# Patient Record
Sex: MALE | Race: WHITE | NOT HISPANIC OR LATINO | Employment: FULL TIME | URBAN - METROPOLITAN AREA
[De-identification: names, ages, dates, MRNs, and addresses within clinical notes are randomized per-mention and may not be internally consistent; named-entity substitution may affect disease eponyms.]

---

## 2017-01-16 ENCOUNTER — OFFICE VISIT (OUTPATIENT)
Dept: SLEEP MEDICINE | Facility: HOSPITAL | Age: 62
End: 2017-01-16

## 2017-01-16 VITALS
SYSTOLIC BLOOD PRESSURE: 120 MMHG | WEIGHT: 239 LBS | HEART RATE: 66 BPM | OXYGEN SATURATION: 98 % | DIASTOLIC BLOOD PRESSURE: 78 MMHG | BODY MASS INDEX: 33.46 KG/M2 | HEIGHT: 71 IN

## 2017-01-16 DIAGNOSIS — E66.9 OBESITY (BMI 30-39.9): ICD-10-CM

## 2017-01-16 DIAGNOSIS — G47.33 MODERATE OBSTRUCTIVE SLEEP APNEA: Primary | ICD-10-CM

## 2017-01-16 PROBLEM — E11.9 DIABETES MELLITUS (HCC): Status: ACTIVE | Noted: 2017-01-16

## 2017-01-16 PROBLEM — E78.5 DYSLIPIDEMIA: Status: ACTIVE | Noted: 2017-01-16

## 2017-01-16 PROBLEM — M19.90 ARTHRITIS: Status: ACTIVE | Noted: 2017-01-16

## 2017-01-16 PROBLEM — E03.9 HYPOTHYROIDISM: Status: ACTIVE | Noted: 2017-01-16

## 2017-01-16 PROCEDURE — 99213 OFFICE O/P EST LOW 20 MIN: CPT | Performed by: INTERNAL MEDICINE

## 2017-01-16 RX ORDER — CIPROFLOXACIN 250 MG/1
TABLET, FILM COATED ORAL
Refills: 0 | COMMUNITY
Start: 2016-11-16 | End: 2018-04-30

## 2017-01-16 RX ORDER — METFORMIN HYDROCHLORIDE 500 MG/1
TABLET, EXTENDED RELEASE ORAL
COMMUNITY
Start: 2017-01-12

## 2017-01-16 RX ORDER — ATORVASTATIN CALCIUM 40 MG/1
TABLET, FILM COATED ORAL
Refills: 12 | COMMUNITY
Start: 2017-01-05

## 2017-01-16 NOTE — LETTER
January 16, 2017     Michael Stephens MD  [address] [facility] [facility] [address]  [facility] [address]    Patient: Josse Israel   YOB: 1955   Date of Visit: 1/16/2017       Dear Dr. Kat MD:    Thank you for referring Josse Israel to me for evaluation. Below are the relevant portions of my assessment and plan of care.    If you have questions, please do not hesitate to call me. I look forward to following Josse along with you.         Sincerely,        Azam Simon MD        CC: No Recipients  Azam Simon MD  1/16/2017  9:42 AM  Sign at close encounter      Subjective:     Chief Complaint:   Chief Complaint   Patient presents with   • Follow-up       HPI:    Josse Israel is a 61 y.o. male here for follow-up of obstructive sleep apnea.    Since starting PAP sleep problem has: remained the same  Currently using PAP: yes Hours of usage during the night: 5    Amount of sleep per night : 6 hours  Average length of time it takes to fall asleep : 15 minutes  Number of awakenings per night : 2     He feels fatigue (tiredness, exhaustion, lethargy) in the daytime even when not sleepy? Occasionally   He feels sleepy (or struggles to stay awake) in the daytime? Occasionally     [place] Scale scored as 7/24.    Type of mask: full face mask    He (since starting PAP or since last visit) has problems with the following:   Pressure from the mask 5 - Moderate Problems  Skin irritation from the mask 0 - No Problem  Mask coming off face 0 - No Problem  Air leaks from the mask 9 - Severe Problems  Dry mouth or throat 0 - No Problem  Nasal congestion 0 - No Problem    I reviewed his PAP download:  Average pressure: 12  Average AHI:  1  Average minutes in large leak per night: 60      Current medications are:   Current Outpatient Prescriptions:   •  aspirin 81 MG EC tablet, Take 81 mg by mouth Daily., Disp: , Rfl:   •  atorvastatin (LIPITOR) 20 MG tablet, Take 20 mg by mouth Daily., Disp: ,  Rfl:   •  atorvastatin (LIPITOR) 40 MG tablet, , Disp: , Rfl: 12  •  cilostazol (PLETAL) 100 MG tablet, Take 100 mg by mouth 2 (Two) Times a Day., Disp: , Rfl:   •  diclofenac-misoprostol (ARTHROTEC) 75-0.2 MG EC tablet, Take 1 tablet by mouth 2 (Two) Times a Day., Disp: , Rfl:   •  fenofibrate 160 MG tablet, Take 160 mg by mouth Daily., Disp: , Rfl:   •  glucosamine-chondroitin 500-400 MG capsule capsule, Take  by mouth 3 (Three) Times a Day With Meals., Disp: , Rfl:   •  levothyroxine (LEVOXYL) 75 MCG tablet, Take 75 mcg by mouth Daily., Disp: , Rfl:   •  levothyroxine (SYNTHROID, LEVOTHROID) 88 MCG tablet, Take 88 mcg by mouth Daily., Disp: , Rfl:   •  metFORMIN (GLUCOPHAGE) 500 MG tablet, Take 500 mg by mouth 2 (Two) Times a Day With Meals., Disp: , Rfl:   •  metFORMIN XR (GLUCOPHAGE-XR) 500 MG 24 hr tablet, , Disp: , Rfl:   •  Omega-3 Fatty Acids (FISH OIL) 1000 MG capsule capsule, Take  by mouth Daily With Breakfast., Disp: , Rfl:   •  testosterone (ANDROGEL) 25 MG/2.5GM (1%) gel gel, Place  on the skin Daily., Disp: , Rfl:   •  ciprofloxacin (CIPRO) 250 MG tablet, , Disp: , Rfl: 0.      The patient's relevant past medical, surgical, family and social history were reviewed and updated in Epic as appropriate.     ROS:    Review of Systems   Constitutional: Positive for fatigue. Negative for unexpected weight change.   Psychiatric/Behavioral: Positive for sleep disturbance.         Objective:    Physical Exam   Constitutional: He is oriented to person, place, and time. He appears well-developed and well-nourished.   HENT:   Head: Normocephalic and atraumatic.   Mouth/Throat: Oropharynx is clear and moist.   Class II airway   Neck: Neck supple. No thyromegaly present.   Cardiovascular: Normal rate and regular rhythm.  Exam reveals no gallop and no friction rub.    No murmur heard.  Pulmonary/Chest: Effort normal. No respiratory distress. He has no wheezes. He has no rales.   Abdominal: Soft. Bowel sounds are  normal.   Musculoskeletal: He exhibits no edema.   Neurological: He is alert and oriented to person, place, and time.   Skin: Skin is warm and dry.   Psychiatric: He has a normal mood and affect. His behavior is normal.   Vitals reviewed.        Assessment:    Problem List Items Addressed This Visit        Pulmonary Problems    Moderate obstructive sleep apnea - Primary    Overview     CPAP 11            Other    Obesity (BMI 30-39.9)              Plan:     No change in PAP settings.  No change in his mask size or type.  Continued efforts at further weight loss.  I urged 7-8 hours of sleep per night on average.  I renewed supplies for the next year.  Follow-up scheduled.  If problems in the interim he knows how to contact us or his [employer] company.        Signed by  MD BREE Fried Dragon/Transcription disclaimer:  Much of this encounter note is an electronic transcription/translation of spoken language to printed text.  Electronic translation of spoken language may permit erroneous, or at times, nonsensical words or phrases to be inadvertently transcribed.  Although I have reviewed the note for such errors, some may still exist.

## 2017-01-16 NOTE — PROGRESS NOTES
Subjective:     Chief Complaint:   Chief Complaint   Patient presents with   • Follow-up       HPI:    Josse Israel is a 61 y.o. male here for follow-up of obstructive sleep apnea.    Since starting PAP sleep problem has: remained the same  Currently using PAP: yes Hours of usage during the night: 5    Amount of sleep per night : 6 hours  Average length of time it takes to fall asleep : 15 minutes  Number of awakenings per night : 2     He feels fatigue (tiredness, exhaustion, lethargy) in the daytime even when not sleepy? Occasionally   He feels sleepy (or struggles to stay awake) in the daytime? Occasionally     [place] Scale scored as 7/24.    Type of mask: full face mask    He (since starting PAP or since last visit) has problems with the following:   Pressure from the mask 5 - Moderate Problems  Skin irritation from the mask 0 - No Problem  Mask coming off face 0 - No Problem  Air leaks from the mask 9 - Severe Problems  Dry mouth or throat 0 - No Problem  Nasal congestion 0 - No Problem    I reviewed his PAP download:  Average pressure: 12  Average AHI:  1  Average minutes in large leak per night: 60      Current medications are:   Current Outpatient Prescriptions:   •  aspirin 81 MG EC tablet, Take 81 mg by mouth Daily., Disp: , Rfl:   •  atorvastatin (LIPITOR) 20 MG tablet, Take 20 mg by mouth Daily., Disp: , Rfl:   •  atorvastatin (LIPITOR) 40 MG tablet, , Disp: , Rfl: 12  •  cilostazol (PLETAL) 100 MG tablet, Take 100 mg by mouth 2 (Two) Times a Day., Disp: , Rfl:   •  diclofenac-misoprostol (ARTHROTEC) 75-0.2 MG EC tablet, Take 1 tablet by mouth 2 (Two) Times a Day., Disp: , Rfl:   •  fenofibrate 160 MG tablet, Take 160 mg by mouth Daily., Disp: , Rfl:   •  glucosamine-chondroitin 500-400 MG capsule capsule, Take  by mouth 3 (Three) Times a Day With Meals., Disp: , Rfl:   •  levothyroxine (LEVOXYL) 75 MCG tablet, Take 75 mcg by mouth Daily., Disp: , Rfl:   •  levothyroxine (SYNTHROID, LEVOTHROID) 88  MCG tablet, Take 88 mcg by mouth Daily., Disp: , Rfl:   •  metFORMIN (GLUCOPHAGE) 500 MG tablet, Take 500 mg by mouth 2 (Two) Times a Day With Meals., Disp: , Rfl:   •  metFORMIN XR (GLUCOPHAGE-XR) 500 MG 24 hr tablet, , Disp: , Rfl:   •  Omega-3 Fatty Acids (FISH OIL) 1000 MG capsule capsule, Take  by mouth Daily With Breakfast., Disp: , Rfl:   •  testosterone (ANDROGEL) 25 MG/2.5GM (1%) gel gel, Place  on the skin Daily., Disp: , Rfl:   •  ciprofloxacin (CIPRO) 250 MG tablet, , Disp: , Rfl: 0.      The patient's relevant past medical, surgical, family and social history were reviewed and updated in Epic as appropriate.     ROS:    Review of Systems   Constitutional: Positive for fatigue. Negative for unexpected weight change.   Psychiatric/Behavioral: Positive for sleep disturbance.         Objective:    Physical Exam   Constitutional: He is oriented to person, place, and time. He appears well-developed and well-nourished.   HENT:   Head: Normocephalic and atraumatic.   Mouth/Throat: Oropharynx is clear and moist.   Class II airway   Neck: Neck supple. No thyromegaly present.   Cardiovascular: Normal rate and regular rhythm.  Exam reveals no gallop and no friction rub.    No murmur heard.  Pulmonary/Chest: Effort normal. No respiratory distress. He has no wheezes. He has no rales.   Abdominal: Soft. Bowel sounds are normal.   Musculoskeletal: He exhibits no edema.   Neurological: He is alert and oriented to person, place, and time.   Skin: Skin is warm and dry.   Psychiatric: He has a normal mood and affect. His behavior is normal.   Vitals reviewed.        Assessment:    Problem List Items Addressed This Visit        Pulmonary Problems    Moderate obstructive sleep apnea - Primary    Overview     CPAP 11            Other    Obesity (BMI 30-39.9)              Plan:     No change in PAP settings.  No change in his mask size or type.  Continued efforts at further weight loss.  I urged 7-8 hours of sleep per night on  average.  I renewed supplies for the next year.  Follow-up scheduled.  If problems in the interim he knows how to contact us or his DME company.        Signed by  MD BREE Fried/Transcription disclaimer:  Much of this encounter note is an electronic transcription/translation of spoken language to printed text.  Electronic translation of spoken language may permit erroneous, or at times, nonsensical words or phrases to be inadvertently transcribed.  Although I have reviewed the note for such errors, some may still exist.

## 2017-01-16 NOTE — Clinical Note
January 16, 2017     Michael Stephens MD  [address] [facility] [address]  [facility] [address]    Patient: Josse Israel   YOB: 1955   Date of Visit: 1/16/2017       Dear Dr. Kat MD:    Thank you for referring Josse Israel to me for evaluation. Below are the relevant portions of my assessment and plan of care.                   If you have questions, please do not hesitate to call me. I look forward to following Josse along with you.         Sincerely,        Azam Simon MD        CC: No Recipients

## 2017-01-16 NOTE — MR AVS SNAPSHOT
Josse Israel   1/16/2017 8:45 AM   Office Visit    Dept Phone:  [phone]   Encounter #:  [id number]    Provider:  Azam Simon MD   Department:  [facility]                Your Full Care Plan              Your Updated Medication List          This list is accurate as of: 1/16/17  9:58 AM.  Always use your most recent med list.                ARTHROTEC 75-0.2 MG EC tablet   Generic drug:  diclofenac-misoprostol       aspirin 81 MG EC tablet       * atorvastatin 20 MG tablet   Commonly known as:  LIPITOR       * atorvastatin 40 MG tablet   Commonly known as:  LIPITOR       cilostazol 100 MG tablet   Commonly known as:  PLETAL       ciprofloxacin 250 MG tablet   Commonly known as:  CIPRO       fenofibrate 160 MG tablet       fish oil 1000 MG capsule capsule       glucosamine-chondroitin 500-400 MG capsule capsule       * levothyroxine 88 MCG tablet   Commonly known as:  SYNTHROID, LEVOTHROID       * LEVOXYL 75 MCG tablet   Generic drug:  levothyroxine       * metFORMIN 500 MG tablet   Commonly known as:  GLUCOPHAGE       * metFORMIN  MG 24 hr tablet   Commonly known as:  GLUCOPHAGE-XR       testosterone 25 MG/2.5GM (1%) gel gel   Commonly known as:  ANDROGEL       * Notice:  This list has 6 medication(s) that are the same as other medications prescribed for you. Read the directions carefully, and ask your doctor or other care provider to review them with you.            We Performed the Following     PAP Therapy       You Were Diagnosed With        Codes Comments    Moderate obstructive sleep apnea    -  Primary ICD-10-CM: G47.33  ICD-9-CM: 327.23     Obesity (BMI 30-39.9)     ICD-10-CM: E66.9  ICD-9-CM: 278.00       Instructions     None    Patient Instructions History      Upcoming Appointments     Visit Type Date Time Department    FOLLOW UP 1/16/2017  8:45 AM [facility]      MyChart Signup     [facility] MyChart allows  "you to send messages to your doctor, view your test results, renew your prescriptions, schedule appointments, and more. To sign up, go to [email or web address] and click on the Sign Up Now link in the New User? box. Enter your [employer] Activation Code exactly as it appears below along with the last four digits of your Social Security Number and your Date of Birth () to complete the sign-up process. If you do not sign up before the expiration date, you must request a new code.    [employer] Activation Code: [email or web address]  Expires: 2017  9:58 AM    If you have questions, you can email [email or web address] or call [phone] to talk to our [employer] staff. Remember, [employer] is NOT to be used for urgent needs. For medical emergencies, dial 911.               Other Info from Your Visit           Allergies     Cephalosporins      Niacin And Related      Penicillins      Sulfa Antibiotics      Tetracyclines & Related        Reason for Visit     Follow-up           Vital Signs     Blood Pressure Pulse Height Weight Oxygen Saturation Body Mass Index    120/78 66 71\" (180.3 cm) 239 lb (108 kg) 98% 33.33 kg/m2    Smoking Status                   Light Tobacco Smoker           Problems and Diagnoses Noted     Arthritis    Diabetes    Dyslipidemia    Underactive thyroid    Moderate obstructive sleep apnea    Obesity (BMI 30-39.9)        "

## 2017-11-13 ENCOUNTER — TRANSCRIBE ORDERS (OUTPATIENT)
Dept: ADMINISTRATIVE | Facility: HOSPITAL | Age: 62
End: 2017-11-13

## 2017-11-13 DIAGNOSIS — Z87.891 PERSONAL HISTORY OF TOBACCO USE, PRESENTING HAZARDS TO HEALTH: Primary | ICD-10-CM

## 2017-11-13 DIAGNOSIS — I25.10 CARDIOVASCULAR DISEASE: ICD-10-CM

## 2017-11-27 ENCOUNTER — HOSPITAL ENCOUNTER (OUTPATIENT)
Dept: CT IMAGING | Facility: HOSPITAL | Age: 62
Discharge: HOME | End: 2017-11-27
Attending: FAMILY MEDICINE | Admitting: FAMILY MEDICINE

## 2017-11-27 ENCOUNTER — HOSPITAL ENCOUNTER (OUTPATIENT)
Dept: CARDIOLOGY | Facility: HOSPITAL | Age: 62
Discharge: HOME | End: 2017-11-27
Attending: FAMILY MEDICINE

## 2017-11-27 VITALS — WEIGHT: 230 LBS | BODY MASS INDEX: 32.2 KG/M2 | HEIGHT: 71 IN

## 2017-11-27 DIAGNOSIS — I25.10 CARDIOVASCULAR DISEASE: ICD-10-CM

## 2017-11-27 DIAGNOSIS — Z87.891 PERSONAL HISTORY OF TOBACCO USE, PRESENTING HAZARDS TO HEALTH: ICD-10-CM

## 2017-11-27 LAB
BH CV STRESS BP STAGE 1: NORMAL
BH CV STRESS BP STAGE 2: NORMAL
BH CV STRESS BP STAGE 3: NORMAL
BH CV STRESS DURATION MIN STAGE 1: 3
BH CV STRESS DURATION MIN STAGE 2: 3
BH CV STRESS DURATION MIN STAGE 3: 2
BH CV STRESS DURATION SEC STAGE 1: 0
BH CV STRESS DURATION SEC STAGE 2: 0
BH CV STRESS DURATION SEC STAGE 3: 46
BH CV STRESS GRADE STAGE 1: 10
BH CV STRESS GRADE STAGE 2: 12
BH CV STRESS GRADE STAGE 3: 14
BH CV STRESS HR STAGE 1: 97
BH CV STRESS HR STAGE 2: 115
BH CV STRESS HR STAGE 3: 126
BH CV STRESS METS STAGE 1: 5
BH CV STRESS METS STAGE 2: 7.5
BH CV STRESS METS STAGE 3: 10
BH CV STRESS PROTOCOL 1: NORMAL
BH CV STRESS RECOVERY BP: NORMAL MMHG
BH CV STRESS RECOVERY HR: 79 BPM
BH CV STRESS SPEED STAGE 1: 1.7
BH CV STRESS SPEED STAGE 2: 2.5
BH CV STRESS SPEED STAGE 3: 3.4
BH CV STRESS STAGE 1: 1
BH CV STRESS STAGE 2: 2
BH CV STRESS STAGE 3: 3
LV EF NUC BP: 80 %
MAXIMAL PREDICTED HEART RATE: 158 BPM
PERCENT MAX PREDICTED HR: 91.14 %
STRESS BASELINE BP: NORMAL MMHG
STRESS BASELINE HR: 55 BPM
STRESS PERCENT HR: 107 %
STRESS POST ESTIMATED WORKLOAD: 10.1 METS
STRESS POST EXERCISE DUR MIN: 8 MIN
STRESS POST EXERCISE DUR SEC: 46 SEC
STRESS POST PEAK BP: NORMAL MMHG
STRESS POST PEAK HR: 144 BPM
STRESS TARGET HR: 134 BPM

## 2017-11-27 PROCEDURE — 0 TECHNETIUM SESTAMIBI: Performed by: FAMILY MEDICINE

## 2017-11-27 PROCEDURE — G0297 LDCT FOR LUNG CA SCREEN: HCPCS

## 2017-11-27 PROCEDURE — 93017 CV STRESS TEST TRACING ONLY: CPT

## 2017-11-27 PROCEDURE — A9500 TC99M SESTAMIBI: HCPCS | Performed by: FAMILY MEDICINE

## 2017-11-27 PROCEDURE — 78452 HT MUSCLE IMAGE SPECT MULT: CPT

## 2017-11-27 RX ADMIN — TECHNETIUM TC-99M SESTAMIBI 1 DOSE: 1 INJECTION INTRAVENOUS at 10:15

## 2017-11-27 RX ADMIN — TECHNETIUM TC-99M SESTAMIBI 1 DOSE: 1 INJECTION INTRAVENOUS at 08:00

## 2018-04-30 ENCOUNTER — OFFICE VISIT (OUTPATIENT)
Dept: SLEEP MEDICINE | Facility: HOSPITAL | Age: 63
End: 2018-04-30

## 2018-04-30 VITALS
SYSTOLIC BLOOD PRESSURE: 124 MMHG | BODY MASS INDEX: 33.04 KG/M2 | OXYGEN SATURATION: 94 % | DIASTOLIC BLOOD PRESSURE: 66 MMHG | HEART RATE: 89 BPM | WEIGHT: 236 LBS | HEIGHT: 71 IN

## 2018-04-30 DIAGNOSIS — G47.33 MODERATE OBSTRUCTIVE SLEEP APNEA: Primary | ICD-10-CM

## 2018-04-30 DIAGNOSIS — E66.9 OBESITY (BMI 30-39.9): ICD-10-CM

## 2018-04-30 PROCEDURE — 99214 OFFICE O/P EST MOD 30 MIN: CPT | Performed by: INTERNAL MEDICINE

## 2018-04-30 NOTE — PROGRESS NOTES
Subjective:     Chief Complaint:   Chief Complaint   Patient presents with   • Follow-up       HPI:    Josse Israel is a 62 y.o. male here for follow-up of obstructive sleep apnea.    He remains on CPAP therapy at a pressure of 12.  He uses a fullface mask.  He is averaging about 5 hours of CPAP usage per night.    Further details are as follows:    Since last visit sleep problem has: remained the same  Currently using PAP: yes Hours of usage during the night: 5    Amount of sleep per night : 6 hours  Average length of time it takes to fall asleep : 15 minutes  Number of awakenings per night : 1     He feels fatigue (tiredness, exhaustion, lethargy) in the daytime even when not sleepy? Occasionally   He feels sleepy (or struggles to stay awake) in the daytime? Infrequently    [place] Scale scored as 7/24.    Type of mask: full face mask    He (since starting PAP or since last visit) has problems with the following:   Pressure from the mask 4 - Moderate Problems  Skin irritation from the mask 0 - No Problem  Mask coming off face 0 - No Problem  Air leaks from the mask 5 - Moderate Problems  Dry mouth or throat 8 - Severe Problems  Nasal congestion 0 - No Problem    I reviewed his PAP download:  Average pressure: 12  Average AHI:  2  Average minutes in large leak per night: 40      Current medications are:   Current Outpatient Prescriptions:   •  aspirin 81 MG EC tablet, Take 81 mg by mouth Daily., Disp: , Rfl:   •  atorvastatin (LIPITOR) 20 MG tablet, Take 20 mg by mouth Daily., Disp: , Rfl:   •  atorvastatin (LIPITOR) 40 MG tablet, , Disp: , Rfl: 12  •  cilostazol (PLETAL) 100 MG tablet, Take 100 mg by mouth 2 (Two) Times a Day., Disp: , Rfl:   •  diclofenac-misoprostol (ARTHROTEC) 75-0.2 MG EC tablet, Take 1 tablet by mouth 2 (Two) Times a Day., Disp: , Rfl:   •  fenofibrate 160 MG tablet, Take 160 mg by mouth Daily., Disp: , Rfl:   •  glucosamine-chondroitin 500-400 MG capsule capsule, Take  by mouth 3  (Three) Times a Day With Meals., Disp: , Rfl:   •  levothyroxine (LEVOXYL) 75 MCG tablet, Take 75 mcg by mouth Daily., Disp: , Rfl:   •  levothyroxine (SYNTHROID, LEVOTHROID) 88 MCG tablet, Take 88 mcg by mouth Daily., Disp: , Rfl:   •  metFORMIN (GLUCOPHAGE) 500 MG tablet, Take 500 mg by mouth 2 (Two) Times a Day With Meals., Disp: , Rfl:   •  metFORMIN XR (GLUCOPHAGE-XR) 500 MG 24 hr tablet, , Disp: , Rfl: .      The patient's relevant past medical, surgical, family and social history were reviewed and updated in Epic as appropriate.     ROS:    Review of Systems   Constitutional: Positive for fatigue.   Respiratory: Positive for apnea.    Psychiatric/Behavioral: Negative for sleep disturbance.         Objective:    Physical Exam   Constitutional: He is oriented to person, place, and time. He appears well-developed and well-nourished.   HENT:   Head: Normocephalic and atraumatic.   Mouth/Throat: Oropharynx is clear and moist.   Class II airway   Neck: Neck supple. No thyromegaly present.   Cardiovascular: Normal rate and regular rhythm.  Exam reveals no gallop and no friction rub.    No murmur heard.  Pulmonary/Chest: Effort normal. No respiratory distress. He has no wheezes. He has no rales.   Musculoskeletal: He exhibits no edema.   Neurological: He is alert and oriented to person, place, and time.   Skin: Skin is warm and dry.   Psychiatric: He has a normal mood and affect. His behavior is normal.   Vitals reviewed.      Data:    Patient's PAP download was personally reviewed including raw data and results.    Assessment:    Problem List Items Addressed This Visit        Pulmonary Problems    Moderate obstructive sleep apnea - Primary    Overview     CPAP 11            Other    Obesity (BMI 30-39.9)      Other Visit Diagnoses    None.         Adequate treatment of his obstructive sleep apnea with CPAP of 12 cm of water.  He is utilizing a fullface mask.  I encouraged him to increase total usage time and sleep  time.    Plan:     1. No change in CPAP settings  2. No change in CPAP mask size or type needed unless he desires.  3. I renewed his supplies for the next year  4. Routine follow-up      Discussed in detail with the patient.  He will call prior to his follow up visit for any new problems.    Signed by  Azam Simon MD

## 2018-08-17 ENCOUNTER — TRANSCRIBE ORDERS (OUTPATIENT)
Dept: ADMINISTRATIVE | Facility: HOSPITAL | Age: 63
End: 2018-08-17

## 2018-08-17 ENCOUNTER — HOSPITAL ENCOUNTER (OUTPATIENT)
Dept: GENERAL RADIOLOGY | Facility: HOSPITAL | Age: 63
Discharge: HOME | End: 2018-08-17
Admitting: NURSE PRACTITIONER

## 2018-08-17 DIAGNOSIS — R31.9 HEMATURIA, UNSPECIFIED TYPE: Primary | ICD-10-CM

## 2018-08-17 PROCEDURE — 74018 RADEX ABDOMEN 1 VIEW: CPT

## 2018-08-20 ENCOUNTER — TRANSCRIBE ORDERS (OUTPATIENT)
Dept: ADMINISTRATIVE | Facility: HOSPITAL | Age: 63
End: 2018-08-20

## 2018-08-20 DIAGNOSIS — R31.9 HEMATURIA, UNSPECIFIED TYPE: ICD-10-CM

## 2018-08-20 DIAGNOSIS — R93.5 ABNORMAL ABDOMINAL X-RAY: Primary | ICD-10-CM

## 2018-08-27 ENCOUNTER — HOSPITAL ENCOUNTER (OUTPATIENT)
Dept: CT IMAGING | Facility: HOSPITAL | Age: 63
Discharge: HOME | End: 2018-08-27
Admitting: NURSE PRACTITIONER

## 2018-08-27 DIAGNOSIS — R31.9 HEMATURIA, UNSPECIFIED TYPE: ICD-10-CM

## 2018-08-27 DIAGNOSIS — R93.5 ABNORMAL ABDOMINAL X-RAY: ICD-10-CM

## 2018-08-27 LAB — CREAT BLDA-MCNC: 1.3 MG/DL (ref 0.6–1.3)

## 2018-08-27 PROCEDURE — 25010000002 IOPAMIDOL 61 % SOLUTION: Performed by: NURSE PRACTITIONER

## 2018-08-27 PROCEDURE — 74177 CT ABD & PELVIS W/CONTRAST: CPT

## 2018-08-27 PROCEDURE — 82565 ASSAY OF CREATININE: CPT

## 2018-08-27 RX ADMIN — IOPAMIDOL 95 ML: 612 INJECTION, SOLUTION INTRAVENOUS at 13:53

## 2019-05-06 ENCOUNTER — OFFICE VISIT (OUTPATIENT)
Dept: SLEEP MEDICINE | Facility: HOSPITAL | Age: 64
End: 2019-05-06

## 2019-05-06 VITALS
WEIGHT: 221 LBS | HEART RATE: 57 BPM | DIASTOLIC BLOOD PRESSURE: 75 MMHG | HEIGHT: 71 IN | SYSTOLIC BLOOD PRESSURE: 144 MMHG | BODY MASS INDEX: 30.94 KG/M2 | OXYGEN SATURATION: 97 %

## 2019-05-06 DIAGNOSIS — G47.33 OSA (OBSTRUCTIVE SLEEP APNEA): Primary | ICD-10-CM

## 2019-05-06 PROCEDURE — 99213 OFFICE O/P EST LOW 20 MIN: CPT | Performed by: NURSE PRACTITIONER

## 2019-05-06 RX ORDER — SITAGLIPTIN 100 MG/1
TABLET, FILM COATED ORAL
Refills: 5 | COMMUNITY
Start: 2019-04-15

## 2019-05-06 NOTE — PATIENT INSTRUCTIONS

## 2019-05-06 NOTE — PROGRESS NOTES
Subjective: Follow-up        Chief Complaint:   Chief Complaint   Patient presents with   • Follow-up       HPI:    Josse Israel is a 63 y.o. male here for follow-up of sleep apnea.  Patient was last seen 4/30/2018.  Patient states he is doing very well with CPAP therapy.  Patient is sleeping 6 hours nightly and feels refreshed upon awakening.  Patient has an [place] score of 7/24.  Patient is doing well with current setting of 12 cm H2O.  Patient has no complaints today and wishes to continue CPAP.        Current medications are:   Current Outpatient Medications:   •  aspirin 81 MG EC tablet, Take 81 mg by mouth Daily., Disp: , Rfl:   •  atorvastatin (LIPITOR) 40 MG tablet, , Disp: , Rfl: 12  •  cilostazol (PLETAL) 100 MG tablet, Take 100 mg by mouth 2 (Two) Times a Day., Disp: , Rfl:   •  fenofibrate 160 MG tablet, Take 160 mg by mouth Daily., Disp: , Rfl:   •  glucosamine-chondroitin 500-400 MG capsule capsule, Take  by mouth 3 (Three) Times a Day With Meals., Disp: , Rfl:   •  levothyroxine (LEVOXYL) 125 MCG tablet, Take 75 mcg by mouth Daily., Disp: , Rfl:   •  levothyroxine (SYNTHROID, LEVOTHROID) 88 MCG tablet, Take 88 mcg by mouth Daily., Disp: , Rfl:   •  metFORMIN (GLUCOPHAGE) 500 MG tablet, Take 500 mg by mouth 2 (Two) Times a Day With Meals., Disp: , Rfl:   •  metFORMIN XR (GLUCOPHAGE-XR) 500 MG 24 hr tablet, , Disp: , Rfl:   •  atorvastatin (LIPITOR) 20 MG tablet, Take 20 mg by mouth Daily., Disp: , Rfl:   •  diclofenac-misoprostol (ARTHROTEC) 75-0.2 MG EC tablet, Take 1 tablet by mouth 2 (Two) Times a Day., Disp: , Rfl:   •  JANUVIA 100 MG tablet, TAKE 1 TABLET BY MOUTH DAILY FOR DIABETES, Disp: , Rfl: 5.      The patient's relevant past medical, surgical, family and social history were reviewed and updated in Epic as appropriate.       Review of Systems   Eyes: Positive for visual disturbance.   Respiratory: Positive for apnea.    Endocrine: Positive for cold intolerance.   Musculoskeletal: Positive  for arthralgias and joint swelling.   Psychiatric/Behavioral: Positive for sleep disturbance.   All other systems reviewed and are negative.        Objective:    Physical Exam   Constitutional: He is oriented to person, place, and time. He appears well-developed and well-nourished.   HENT:   Head: Normocephalic and atraumatic.   Mouth/Throat: Oropharynx is clear and moist.   Mallampati 2 anatomy   Eyes: Conjunctivae are normal.   Neck: Neck supple.   Cardiovascular: Normal rate and regular rhythm.   Pulmonary/Chest: Effort normal and breath sounds normal.   Neurological: He is alert and oriented to person, place, and time.   Skin: Skin is warm and dry.   Psychiatric: He has a normal mood and affect. His behavior is normal. Judgment and thought content normal.   Nursing note and vitals reviewed.  142 0.80 days of use.  Greater than 4-hour use 62.2%.  CPAP at 12 cm H2O.  AHI 1.5.  Download reviewed with patient.      ASSESSMENT/PLAN    Josse was seen today for follow-up.    Diagnoses and all orders for this visit:    KARI (obstructive sleep apnea)  -     CPAP Therapy            1. Counseled patient regarding multimodal approach with healthy nutrition, healthy sleep, regular physical activity, social activities, counseling, and medications. Encouraged to practice lateral sleep position. Avoid alcohol and sedatives close to bedtime.  2.   Increase use.  Refill supplies x1 year.  Return to clinic 1 year or sooner if symptoms warrant.  I have reviewed the results of my evaluation and impression and discussed my recommendations in detail with the patient.      Signed by  Chetna Sims, JULISSA    May 6, 2019      CC: Michael Stephens MD          No ref. provider found

## 2020-06-03 ENCOUNTER — OFFICE VISIT (OUTPATIENT)
Dept: SLEEP MEDICINE | Facility: HOSPITAL | Age: 65
End: 2020-06-03

## 2020-06-03 VITALS
OXYGEN SATURATION: 97 % | HEIGHT: 71 IN | BODY MASS INDEX: 31.5 KG/M2 | DIASTOLIC BLOOD PRESSURE: 87 MMHG | SYSTOLIC BLOOD PRESSURE: 144 MMHG | HEART RATE: 75 BPM | WEIGHT: 225 LBS

## 2020-06-03 DIAGNOSIS — G47.33 OSA (OBSTRUCTIVE SLEEP APNEA): Primary | ICD-10-CM

## 2020-06-03 PROCEDURE — 99212 OFFICE O/P EST SF 10 MIN: CPT | Performed by: NURSE PRACTITIONER

## 2020-06-03 NOTE — PROGRESS NOTES
Chief Complaint:   Chief Complaint   Patient presents with   • Follow-up       HPI:    Josse Israel is a 64 y.o. male here for follow-up of sleep apnea.  Patient was last seen 5/6/2019.  Patient states he is doing well with CPAP therapy.  Patient is sleeping 7 hours nightly and does feel refreshed upon awakening.  Patient will take 15 minutes or less to go to sleep and does not awaken during the night.  Patient has an [place] score of 2/24.  Patient has no complaints of skin irritation from the mask, difficulty putting mask on or off, dry mucosa, air leak, or difficulty breathing while wearing CPAP.  Patient does wish to continue with CPAP use.    Past Medical History:   Diagnosis Date   • Arthritis    • Diabetes mellitus (CMS/HCC)    • Disease of thyroid gland          Current medications are:   Current Outpatient Medications:   •  aspirin 81 MG EC tablet, Take 81 mg by mouth Daily., Disp: , Rfl:   •  atorvastatin (LIPITOR) 40 MG tablet, , Disp: , Rfl: 12  •  cilostazol (PLETAL) 100 MG tablet, Take 100 mg by mouth 2 (Two) Times a Day., Disp: , Rfl:   •  diclofenac-misoprostol (ARTHROTEC) 75-0.2 MG EC tablet, Take 1 tablet by mouth 2 (Two) Times a Day., Disp: , Rfl:   •  Empagliflozin (Jardiance) 25 MG tablet, Jardiance 25 mg tablet, Disp: , Rfl:   •  glucosamine-chondroitin 500-400 MG capsule capsule, Take  by mouth 3 (Three) Times a Day With Meals., Disp: , Rfl:   •  JANUVIA 100 MG tablet, TAKE 1 TABLET BY MOUTH DAILY FOR DIABETES, Disp: , Rfl: 5  •  levothyroxine (LEVOXYL) 125 MCG tablet, Take 75 mcg by mouth Daily., Disp: , Rfl:   •  levothyroxine (SYNTHROID, LEVOTHROID) 88 MCG tablet, Take 88 mcg by mouth Daily., Disp: , Rfl:   •  metFORMIN (GLUCOPHAGE) 500 MG tablet, Take 500 mg by mouth 2 (Two) Times a Day With Meals., Disp: , Rfl:   •  atorvastatin (LIPITOR) 20 MG tablet, Take 20 mg by mouth Daily., Disp: , Rfl:   •  fenofibrate 160 MG tablet, Take 160 mg by mouth Daily., Disp: , Rfl:   •  metFORMIN XR  (GLUCOPHAGE-XR) 500 MG 24 hr tablet, , Disp: , Rfl: .      The patient's relevant past medical, surgical, family and social history were reviewed and updated in Epic as appropriate.       Review of Systems   Eyes: Positive for visual disturbance.   Respiratory: Positive for apnea.    Endocrine: Positive for cold intolerance.   Musculoskeletal: Positive for arthralgias and joint swelling.   Psychiatric/Behavioral: Positive for sleep disturbance.   All other systems reviewed and are negative.        Objective:    Physical Exam   Constitutional: He is oriented to person, place, and time. He appears well-developed and well-nourished.   HENT:   Head: Normocephalic and atraumatic.   Mouth/Throat: Oropharynx is clear and moist.   Class 2 airway     Eyes: Conjunctivae are normal.   Neck: Neck supple.   Cardiovascular: Normal rate and regular rhythm.   Pulmonary/Chest: Effort normal and breath sounds normal.   Musculoskeletal: Normal range of motion.   Neurological: He is alert and oriented to person, place, and time.   Skin: Skin is warm and dry.   Psychiatric: He has a normal mood and affect. His behavior is normal. Judgment and thought content normal.     28/30 days of use.  Greater than 4-hour use 83.3 CPAP 12 cm H2O.  AHI 1.5.  Download reviewed with patient.    ASSESSMENT/PLAN    Josse was seen today for follow-up.    Diagnoses and all orders for this visit:    KARI (obstructive sleep apnea)  -     CPAP Therapy            1. Counseled patient regarding multimodal approach with healthy nutrition, healthy sleep, regular physical activity, social activities, counseling, and medications. Encouraged to practice lateral sleep position. Avoid alcohol and sedatives close to bedtime.  2.   Refill supplies x1 year.  Return to clinic 1 year or sooner symptoms warrant.  I have reviewed the results of my evaluation and impression and discussed my recommendations in detail with the patient.      Signed by  Chetna Sims,  APRN    Val[place] 3, 2020      CC: Michael Stephens MD          No ref. provider found

## 2021-07-19 ENCOUNTER — OFFICE VISIT (OUTPATIENT)
Dept: SLEEP MEDICINE | Facility: HOSPITAL | Age: 66
End: 2021-07-19

## 2021-07-19 VITALS
DIASTOLIC BLOOD PRESSURE: 80 MMHG | OXYGEN SATURATION: 97 % | HEART RATE: 72 BPM | WEIGHT: 215 LBS | BODY MASS INDEX: 30.1 KG/M2 | HEIGHT: 71 IN | SYSTOLIC BLOOD PRESSURE: 151 MMHG

## 2021-07-19 DIAGNOSIS — G47.33 OSA (OBSTRUCTIVE SLEEP APNEA): Primary | ICD-10-CM

## 2021-07-19 PROCEDURE — 99213 OFFICE O/P EST LOW 20 MIN: CPT | Performed by: NURSE PRACTITIONER

## 2021-07-19 NOTE — PROGRESS NOTES
Chief Complaint:   Chief Complaint   Patient presents with   • Follow-up       HPI:    Josse Israel is a 65 y.o. male here for follow-up of sleep apnea.  Patient was last seen 6/3/2020.  Patient continues to do very well with CPAP therapy.  He will sleep 6 to 7 hours nightly and does feel rested upon awakening.  Patient will go to sleep within 15 minutes and it is very rare that he will get up during the night.  Patient has an [place] score of 10/24.  Patient has no concerns or complaints regarding CPAP therapy and wishes to continue.        Current medications are:   Current Outpatient Medications:   •  aspirin 81 MG EC tablet, Take 81 mg by mouth Daily., Disp: , Rfl:   •  atorvastatin (LIPITOR) 40 MG tablet, , Disp: , Rfl: 12  •  cilostazol (PLETAL) 100 MG tablet, Take 100 mg by mouth 2 (Two) Times a Day., Disp: , Rfl:   •  diclofenac-misoprostol (ARTHROTEC) 75-0.2 MG EC tablet, Take 1 tablet by mouth 2 (Two) Times a Day., Disp: , Rfl:   •  Empagliflozin (Jardiance) 25 MG tablet, Jardiance 25 mg tablet, Disp: , Rfl:   •  fenofibrate 160 MG tablet, Take 160 mg by mouth Daily., Disp: , Rfl:   •  glucosamine-chondroitin 500-400 MG capsule capsule, Take  by mouth 3 (Three) Times a Day With Meals., Disp: , Rfl:   •  JANUVIA 100 MG tablet, TAKE 1 TABLET BY MOUTH DAILY FOR DIABETES, Disp: , Rfl: 5  •  levothyroxine (LEVOXYL) 125 MCG tablet, Take 75 mcg by mouth Daily., Disp: , Rfl:   •  metFORMIN (GLUCOPHAGE) 500 MG tablet, Take 500 mg by mouth 2 (Two) Times a Day With Meals., Disp: , Rfl:   •  atorvastatin (LIPITOR) 20 MG tablet, Take 20 mg by mouth Daily., Disp: , Rfl:   •  levothyroxine (SYNTHROID, LEVOTHROID) 88 MCG tablet, Take 88 mcg by mouth Daily., Disp: , Rfl:   •  metFORMIN XR (GLUCOPHAGE-XR) 500 MG 24 hr tablet, , Disp: , Rfl: .      The patient's relevant past medical, surgical, family and social history were reviewed and updated in Epic as appropriate.       Review of Systems   Eyes: Positive for  visual disturbance.   Respiratory: Positive for apnea.    Endocrine: Positive for cold intolerance.   Musculoskeletal: Positive for arthralgias and joint swelling.   Neurological: Positive for numbness.   Psychiatric/Behavioral: Positive for sleep disturbance.   All other systems reviewed and are negative.        Objective:    Physical Exam  Constitutional:       Appearance: Normal appearance.   HENT:      Head: Normocephalic and atraumatic.      Mouth/Throat:      Mouth: Mucous membranes are moist.      Pharynx: Oropharynx is clear.      Comments: Class 2 airway  Eyes:      Conjunctiva/sclera: Conjunctivae normal.      Pupils: Pupils are equal, round, and reactive to light.   Cardiovascular:      Rate and Rhythm: Normal rate and regular rhythm.   Pulmonary:      Effort: Pulmonary effort is normal.      Breath sounds: Normal breath sounds.   Skin:     General: Skin is warm and dry.      Coloration: Skin is not pale.   Neurological:      Mental Status: He is alert and oriented to person, place, and time.   Psychiatric:         Mood and Affect: Mood normal.         Behavior: Behavior normal.         Thought Content: Thought content normal.         Judgment: Judgment normal.     89/90 days of use  Greater than 4-hour use 84.4  AHI 1.9  CPAP 12 cm H2O      ASSESSMENT/PLAN    Diagnoses and all orders for this visit:    1. KARI (obstructive sleep apnea) (Primary)  -     CPAP Therapy            1. Counseled patient regarding multimodal approach with healthy nutrition, healthy sleep, regular physical activity, social activities, counseling, and medications. Encouraged to practice lateral  sleep position. Avoid alcohol and sedatives close to bedtime.  2. Refill supplies x1 year.  Return to clinic 1 year or sooner symptoms warrant.    I have reviewed the results of my evaluation and impression and discussed my recommendations in detail with the patient.      Signed by  Chetna Sims, JULISSA    July 19, 2021      CC:  Michael Stephens MD          No ref. provider found

## 2021-12-30 ENCOUNTER — TRANSCRIBE ORDERS (OUTPATIENT)
Dept: ADMINISTRATIVE | Facility: HOSPITAL | Age: 66
End: 2021-12-30

## 2021-12-30 ENCOUNTER — HOSPITAL ENCOUNTER (OUTPATIENT)
Dept: GENERAL RADIOLOGY | Facility: HOSPITAL | Age: 66
Discharge: HOME | End: 2021-12-30
Admitting: STUDENT IN AN ORGANIZED HEALTH CARE EDUCATION/TRAINING PROGRAM

## 2021-12-30 DIAGNOSIS — J20.9 ACUTE BRONCHITIS, UNSPECIFIED ORGANISM: Primary | ICD-10-CM

## 2021-12-30 PROCEDURE — 71046 X-RAY EXAM CHEST 2 VIEWS: CPT

## 2022-07-19 ENCOUNTER — OFFICE VISIT (OUTPATIENT)
Dept: SLEEP MEDICINE | Facility: HOSPITAL | Age: 67
End: 2022-07-19

## 2022-07-19 VITALS
HEART RATE: 77 BPM | SYSTOLIC BLOOD PRESSURE: 138 MMHG | OXYGEN SATURATION: 94 % | DIASTOLIC BLOOD PRESSURE: 69 MMHG | WEIGHT: 216 LBS | BODY MASS INDEX: 30.24 KG/M2 | HEIGHT: 71 IN

## 2022-07-19 DIAGNOSIS — G47.33 OSA (OBSTRUCTIVE SLEEP APNEA): Primary | ICD-10-CM

## 2022-07-19 PROCEDURE — 99213 OFFICE O/P EST LOW 20 MIN: CPT | Performed by: NURSE PRACTITIONER

## 2022-07-19 RX ORDER — QUINAPRIL 10 MG/1
10 TABLET ORAL DAILY
COMMUNITY
Start: 2022-07-01

## 2022-07-19 NOTE — PROGRESS NOTES
Chief Complaint:   Chief Complaint   Patient presents with   • Follow-up       HPI:    Josse Israel is a 66 y.o. male here for follow-up of sleep apnea.  Patient was last seen 7/19/2021.  Patient has a history of dyslipidemia, obesity, hypothyroidism, diabetes, arthritis and sleep apnea.  Patient has not used his machine since last year due to the [employer] recall.  Patient states he would like an order for a new machine as this is 5+ years old.  Without his machine he does have witnessed apneas, snoring, and excessive daytime sleepiness.  He does try to get at least 6 to 8 hours nightly and without CPAP is not rested upon awakening.  He goes to sleep within 15 minutes and will rarely get up during the night.  Patient does have an [place] score of 6/24.  He has on a set pressure of 12 cm H2O we will get this ordered for him today.        Current medications are:   Current Outpatient Medications:   •  aspirin 81 MG EC tablet, Take 81 mg by mouth Daily., Disp: , Rfl:   •  atorvastatin (LIPITOR) 20 MG tablet, Take 20 mg by mouth Daily., Disp: , Rfl:   •  atorvastatin (LIPITOR) 40 MG tablet, , Disp: , Rfl: 12  •  cilostazol (PLETAL) 100 MG tablet, Take 100 mg by mouth 2 (Two) Times a Day., Disp: , Rfl:   •  diclofenac-miSOPROStol (ARTHROTEC 75) 75-0.2 MG EC tablet, Take 1 tablet by mouth 2 (Two) Times a Day., Disp: , Rfl:   •  empagliflozin (JARDIANCE) 25 MG tablet tablet, Jardiance 25 mg tablet, Disp: , Rfl:   •  fenofibrate 160 MG tablet, Take 160 mg by mouth Daily., Disp: , Rfl:   •  glucosamine-chondroitin 500-400 MG capsule capsule, Take  by mouth 3 (Three) Times a Day With Meals., Disp: , Rfl:   •  JANUVIA 100 MG tablet, TAKE 1 TABLET BY MOUTH DAILY FOR DIABETES, Disp: , Rfl: 5  •  levothyroxine (SYNTHROID, LEVOTHROID) 125 MCG tablet, Take 75 mcg by mouth Daily., Disp: , Rfl:   •  levothyroxine (SYNTHROID, LEVOTHROID) 88 MCG tablet, Take 88 mcg by mouth Daily., Disp: , Rfl:   •  metFORMIN (GLUCOPHAGE) 500 MG  "tablet, Take 500 mg by mouth 2 (Two) Times a Day With Meals., Disp: , Rfl:   •  metFORMIN XR (GLUCOPHAGE-XR) 500 MG 24 hr tablet, , Disp: , Rfl:   •  quinapril (ACCUPRIL) 10 MG tablet, Take 10 mg by mouth Daily., Disp: , Rfl: .      The patient's relevant past medical, surgical, family and social history were reviewed and updated in Epic as appropriate.       Review of Systems   Eyes: Positive for visual disturbance.   Respiratory: Positive for apnea.    Endocrine: Positive for cold intolerance.   Musculoskeletal: Positive for arthralgias and joint swelling.   Neurological: Positive for numbness.   Psychiatric/Behavioral: Positive for sleep disturbance.   All other systems reviewed and are negative.        Objective:    Physical Exam  Constitutional:       Appearance: Normal appearance.   HENT:      Head: Normocephalic and atraumatic.      Mouth/Throat:      Comments: Mallampati 2 anatomy  Cardiovascular:      Rate and Rhythm: Normal rate and regular rhythm.   Pulmonary:      Effort: Pulmonary effort is normal.      Breath sounds: Normal breath sounds.   Skin:     General: Skin is warm and dry.   Neurological:      Mental Status: He is alert and oriented to person, place, and time.   Psychiatric:         Mood and Affect: Mood normal.         Behavior: Behavior normal.         Thought Content: Thought content normal.         Judgment: Judgment normal.     /69   Pulse 77   Ht 180.3 cm (71\")   Wt 98 kg (216 lb)   SpO2 94%   BMI 30.13 kg/m²           ASSESSMENT/PLAN    Diagnoses and all orders for this visit:    1. KARI (obstructive sleep apnea) (Primary)  -     PAP Therapy        1. Counseled patient regarding multimodal approach with healthy nutrition, healthy sleep, regular physical activity, social activities, counseling, and medications. Encouraged to practice lateral sleep position. Avoid alcohol and sedatives close to bedtime.  2. Order for new machine setting of 12 cm H2O we will see patient back " following in 31 to 90 days.      I have reviewed the results of my evaluation and impression and discussed my recommendations in detail with the patient.      Signed by  Chetna Sims, APRN    July 19, 2022      CC: Michael Stephens MD         No ref. provider found

## 2022-10-10 ENCOUNTER — TELEPHONE (OUTPATIENT)
Dept: SLEEP MEDICINE | Facility: HOSPITAL | Age: 67
End: 2022-10-10

## 2022-10-10 NOTE — TELEPHONE ENCOUNTER
Caller: Sujey Israel     Relationship:WIFE    Best call back number: [phone]    Can the office call and leave a detailed voicemail regarding the call: [x]Yes []No    Do you have an active MyChart: [x]Yes []No   If yes, can the office send a response through [employer] regarding the recall: [x]Yes []No      Have you registered the machine with [employer]: [x]Yes []No    If NO, you will need to register the machine first, once you have it registered you can give us a call back.      [facility] phone number: [phone]   [employer] website: [email or web address]    If yes, continue with questions:    How old is the current machine: OVER 5 YEARS    Who is the DME: [facility]    PT WIFE STATED THAT THE DECISION ON RECALL  WAS TO NOT SEND PT A NEW MACHINE SINCE IT IS OVER 5 YEARS OLD. WIFE STATED THAT SHE WOULD LIKE TO KNOW IF PT CAN GET A PRESCRIPTION FOR A NEW MACHINE. SHE STATED HE CURRENTLY HAS A TEMP MACHINE HE IS USING. E-MAIL RECVIECED FROM [employer] WAS TO RETURN MACHINE FOR $50 AND REQUEST NEW FROM PROVIDER.

## 2022-11-03 ENCOUNTER — OFFICE VISIT (OUTPATIENT)
Dept: SLEEP MEDICINE | Facility: HOSPITAL | Age: 67
End: 2022-11-03

## 2022-11-03 VITALS
WEIGHT: 221.4 LBS | DIASTOLIC BLOOD PRESSURE: 70 MMHG | HEIGHT: 71 IN | SYSTOLIC BLOOD PRESSURE: 113 MMHG | BODY MASS INDEX: 31 KG/M2 | OXYGEN SATURATION: 96 % | HEART RATE: 74 BPM

## 2022-11-03 DIAGNOSIS — G47.33 OSA (OBSTRUCTIVE SLEEP APNEA): Primary | ICD-10-CM

## 2022-11-03 PROCEDURE — 99213 OFFICE O/P EST LOW 20 MIN: CPT | Performed by: NURSE PRACTITIONER

## 2022-11-03 NOTE — PROGRESS NOTES
Chief Complaint:   Chief Complaint   Patient presents with   • Follow-up       HPI:    Josse Israel is a 67 y.o. male here for follow-up of sleep apnea.  Patient was last seen 7/19/2022.  Patient's machine was 5+ years old and we did do an order for a new machine.  Patient has received his new machine and is doing well.  He sleeps 6 to 8 hours nightly and feels rested upon awakening.  He will go to sleep within 15 minutes only rarely get up during the night.  Patient has an [place] score of 5/24.  Patient is doing well, has no concerns or complaints, and will continue therapy.        Current medications are:   Current Outpatient Medications:   •  aspirin 81 MG EC tablet, Take 81 mg by mouth Daily., Disp: , Rfl:   •  atorvastatin (LIPITOR) 40 MG tablet, , Disp: , Rfl: 12  •  cilostazol (PLETAL) 100 MG tablet, Take 100 mg by mouth 2 (Two) Times a Day., Disp: , Rfl:   •  diclofenac-miSOPROStol (ARTHROTEC 75) 75-0.2 MG EC tablet, Take 1 tablet by mouth 2 (Two) Times a Day., Disp: , Rfl:   •  empagliflozin (JARDIANCE) 25 MG tablet tablet, Jardiance 25 mg tablet, Disp: , Rfl:   •  fenofibrate 160 MG tablet, Take 160 mg by mouth Daily., Disp: , Rfl:   •  glucosamine-chondroitin 500-400 MG capsule capsule, Take  by mouth 3 (Three) Times a Day With Meals., Disp: , Rfl:   •  JANUVIA 100 MG tablet, TAKE 1 TABLET BY MOUTH DAILY FOR DIABETES, Disp: , Rfl: 5  •  levothyroxine (SYNTHROID, LEVOTHROID) 125 MCG tablet, Take 75 mcg by mouth Daily., Disp: , Rfl:   •  metFORMIN XR (GLUCOPHAGE-XR) 500 MG 24 hr tablet, , Disp: , Rfl:   •  quinapril (ACCUPRIL) 10 MG tablet, Take 10 mg by mouth Daily., Disp: , Rfl: .      The patient's relevant past medical, surgical, family and social history were reviewed and updated in Epic as appropriate.       Review of Systems   Eyes: Positive for visual disturbance.   Respiratory: Positive for apnea.    Endocrine: Positive for cold intolerance.   Musculoskeletal: Positive for arthralgias and joint  "swelling.   Neurological: Positive for numbness.   Psychiatric/Behavioral: Positive for sleep disturbance.   All other systems reviewed and are negative.        Objective:    Physical Exam  Cardiovascular:      Rate and Rhythm: Normal rate and regular rhythm.   Pulmonary:      Effort: Pulmonary effort is normal.      Breath sounds: Normal breath sounds.   Skin:     General: Skin is warm and dry.   Neurological:      Mental Status: He is oriented to person, place, and time.   Psychiatric:         Mood and Affect: Mood normal.         Behavior: Behavior normal.         Thought Content: Thought content normal.         Judgment: Judgment normal.     /70   Pulse 74   Ht 180.3 cm (71\")   Wt 100 kg (221 lb 6.4 oz)   SpO2 96%   BMI 30.88 kg/m²       CPAP Report  30/30 days of use  Greater than 4-hour use 83%  Setting 12 cm H2O  AHI 1.2    The patient continues to use and benefit from CPAP therapy.    ASSESSMENT/PLAN    Diagnoses and all orders for this visit:    1. KARI (obstructive sleep apnea) (Primary)  -     PAP Therapy        1. Counseled patient regarding multimodal approach with healthy nutrition, healthy sleep, regular physical activity, social activities, counseling, and medications. Encouraged to practice lateral sleep position. Avoid alcohol and sedatives close to bedtime.  2.   Refill supplies x1 year.  Return to clinic 1 year or sooner symptoms warrant.    I have reviewed the results of my evaluation and impression and discussed my recommendations in detail with the patient.      Signed by  JULISSA Hines    November 3, 2022      CC: Michael Stephens MD         No ref. provider found      "

## 2023-08-10 ENCOUNTER — HOSPITAL ENCOUNTER (OUTPATIENT)
Dept: DIABETES SERVICES | Facility: HOSPITAL | Age: 68
Setting detail: RECURRING SERIES
Discharge: HOME | End: 2023-08-10
Payer: MEDICARE

## 2023-08-10 ENCOUNTER — DOCUMENTATION (OUTPATIENT)
Dept: DIABETES SERVICES | Facility: HOSPITAL | Age: 68
End: 2023-08-10
Payer: MEDICARE

## 2023-08-10 NOTE — PLAN OF CARE
30 minute phone follow up completed. Personal goals addressed and patient very successful with his self management. He has reduced portions, decreased sugary beverages, walking and has lost 5 pounds since his initial class. He shares that his fasting glucose readings are 130 or less down from 250's. He voices satisfaction with the results and this has encouraged him to keep making healthy choices. I praised and encouraged him to reach out to this department if he has concerns or questions regarding his diabetes management. Thank you for this opportunity.

## 2023-11-02 ENCOUNTER — OFFICE VISIT (OUTPATIENT)
Dept: SLEEP MEDICINE | Facility: CLINIC | Age: 68
End: 2023-11-02
Payer: MEDICARE

## 2023-11-02 VITALS
HEIGHT: 71 IN | DIASTOLIC BLOOD PRESSURE: 74 MMHG | TEMPERATURE: 97.6 F | SYSTOLIC BLOOD PRESSURE: 128 MMHG | WEIGHT: 222 LBS | OXYGEN SATURATION: 95 % | BODY MASS INDEX: 31.08 KG/M2 | HEART RATE: 78 BPM

## 2023-11-02 DIAGNOSIS — G47.33 OSA (OBSTRUCTIVE SLEEP APNEA): Primary | ICD-10-CM

## 2023-11-02 PROCEDURE — 99213 OFFICE O/P EST LOW 20 MIN: CPT | Performed by: NURSE PRACTITIONER

## 2023-11-02 RX ORDER — CLOPIDOGREL BISULFATE 75 MG/1
75 TABLET ORAL DAILY
COMMUNITY

## 2023-11-02 NOTE — PROGRESS NOTES
Chief Complaint:   Chief Complaint   Patient presents with    Sleep Apnea    Follow-up       HPI:    Josse Israel is a 68 y.o. male here for follow-up of sleep apnea.  Patient was last seen 11/3/2022.  Patient continues to do well with CPAP therapy.  Patient is sleeping 6 to 8 hours nightly and does feel rested upon awakening.  Patient goes to sleep within 15 minutes will rarely get up during the night.  Patient has an [place] score of 4/24.  Patient has no concerns or complaints and will continue therapy.  Patient states he has not worn his machine for the past 3 weeks due to having bronchitis he is now well and eager to restart        Current medications are:   Current Outpatient Medications:     aspirin 81 MG EC tablet, Take 1 tablet by mouth Daily., Disp: , Rfl:     clopidogrel (PLAVIX) 75 MG tablet, Take 1 tablet by mouth Daily., Disp: , Rfl:     diclofenac-miSOPROStol (ARTHROTEC 75) 75-0.2 MG EC tablet, Take 1 tablet by mouth 2 (Two) Times a Day., Disp: , Rfl:     empagliflozin (JARDIANCE) 25 MG tablet tablet, Jardiance 25 mg tablet, Disp: , Rfl:     glucosamine-chondroitin 500-400 MG capsule capsule, Take  by mouth 3 (Three) Times a Day With Meals., Disp: , Rfl:     JANUVIA 100 MG tablet, TAKE 1 TABLET BY MOUTH DAILY FOR DIABETES, Disp: , Rfl: 5    levothyroxine (SYNTHROID, LEVOTHROID) 125 MCG tablet, Take 75 mcg by mouth Daily., Disp: , Rfl:     metFORMIN XR (GLUCOPHAGE-XR) 500 MG 24 hr tablet, , Disp: , Rfl: .      The patient's relevant past medical, surgical, family and social history were reviewed and updated in Epic as appropriate.       Review of Systems   Eyes:  Positive for visual disturbance.   Respiratory:  Positive for apnea.    Endocrine: Positive for cold intolerance.   Musculoskeletal:  Positive for arthralgias and joint swelling.   Neurological:  Positive for numbness.   Psychiatric/Behavioral:  Positive for sleep disturbance.    All other systems reviewed and are  negative.        Objective:    Physical Exam  Constitutional:       Appearance: Normal appearance.   HENT:      Head: Normocephalic and atraumatic.      Mouth/Throat:      Comments: Class 2 airway  Cardiovascular:      Rate and Rhythm: Normal rate and regular rhythm.   Pulmonary:      Effort: Pulmonary effort is normal.      Breath sounds: Normal breath sounds.   Skin:     General: Skin is warm and dry.   Neurological:      Mental Status: He is alert and oriented to person, place, and time.   Psychiatric:         Mood and Affect: Mood normal.         Behavior: Behavior normal.         Thought Content: Thought content normal.         Judgment: Judgment normal.         CPAP Report  57/90 days of use  Greater than 4-hour use 47%  CPAP 12  AHI 1.3    The patient continues to use and benefit from CPAP therapy.    ASSESSMENT/PLAN    Diagnoses and all orders for this visit:    1. KARI (obstructive sleep apnea) (Primary)  -     PAP Therapy        Counseled patient regarding multimodal approach with healthy nutrition, healthy sleep, regular physical activity, social activities, counseling, and medications. Encouraged to practice lateral sleep position. Avoid alcohol and sedatives close to bedtime.  Refill supplies x1 year.  Return to clinic 1 year sooner symptoms warrant.  Patient encouraged increased use now that he is well      I have reviewed the results of my evaluation and impression and discussed my recommendations in detail with the patient.      Signed by  Chetna Sims, JULISSA    November 2, 2023      CC: Michael Stephens MD         No ref. provider found

## 2024-01-29 ENCOUNTER — EDUCATION (OUTPATIENT)
Dept: DIABETES SERVICES | Facility: HOSPITAL | Age: 69
End: 2024-01-29
Payer: MEDICARE

## 2024-01-29 NOTE — CONSULTS
Diabetes Education    Patient Name:  Josse Israel  YOB: 1955  MRN: [id number]  Admit Date:  (Not on file)        Patient telephoned today and completed diabetes education follow-up via telephone, after diabetes education rec'd his 6 month letter response via mail. Please see media tab for assessment and notes if you use [facility]. If you are not an [employer] user a copy of patient's assessment and notes will be sent per routine. Thank you.       Electronically signed by:  Amanda Guan RN  01/29/24 10:43 EST

## 2024-08-23 ENCOUNTER — TRANSCRIBE ORDERS (OUTPATIENT)
Dept: ADMINISTRATIVE | Facility: HOSPITAL | Age: 69
End: 2024-08-23
Payer: MEDICARE

## 2024-08-23 DIAGNOSIS — H81.4 VERTIGO, CENTRAL: Primary | ICD-10-CM

## 2024-09-23 ENCOUNTER — HOSPITAL ENCOUNTER (OUTPATIENT)
Facility: HOSPITAL | Age: 69
Discharge: HOME | End: 2024-09-23
Admitting: FAMILY MEDICINE
Payer: MEDICARE

## 2024-09-23 DIAGNOSIS — H81.4 VERTIGO, CENTRAL: ICD-10-CM

## 2024-09-23 PROCEDURE — 70551 MRI BRAIN STEM W/O DYE: CPT

## 2024-11-04 ENCOUNTER — OFFICE VISIT (OUTPATIENT)
Dept: SLEEP MEDICINE | Facility: CLINIC | Age: 69
End: 2024-11-04
Payer: MEDICARE

## 2024-11-04 VITALS
HEART RATE: 78 BPM | WEIGHT: 216 LBS | OXYGEN SATURATION: 95 % | HEIGHT: 71 IN | TEMPERATURE: 98 F | DIASTOLIC BLOOD PRESSURE: 60 MMHG | BODY MASS INDEX: 30.24 KG/M2 | SYSTOLIC BLOOD PRESSURE: 118 MMHG

## 2024-11-04 DIAGNOSIS — G47.33 OSA (OBSTRUCTIVE SLEEP APNEA): Primary | ICD-10-CM

## 2024-11-04 PROCEDURE — 99213 OFFICE O/P EST LOW 20 MIN: CPT | Performed by: NURSE PRACTITIONER

## 2024-11-04 RX ORDER — ROSUVASTATIN CALCIUM 40 MG/1
TABLET, COATED ORAL
COMMUNITY

## 2024-11-04 RX ORDER — TAMSULOSIN HYDROCHLORIDE 0.4 MG/1
CAPSULE ORAL
COMMUNITY

## 2024-11-04 RX ORDER — LISINOPRIL 10 MG/1
TABLET ORAL
COMMUNITY

## 2024-11-04 NOTE — PROGRESS NOTES
Chief Complaint:   Chief Complaint   Patient presents with    Follow-up    Sleep Apnea       HPI:    Josse Israel is a 69 y.o. male here for follow-up of Omar apnea.  Patient was last seen 11/2/2023.  Patient will get 6 to 8 hours of sleep nightly and goes to sleep quickly.  He is very rare he will get up in the night.  Patient has an [place] score of 5/24.  Patient does well with fullface mask and standard tubing.  Patient has no concerns or complaints and will continue therapy.        Current medications are:   Current Outpatient Medications:     aspirin 81 MG EC tablet, Take 1 tablet by mouth Daily., Disp: , Rfl:     clopidogrel (PLAVIX) 75 MG tablet, Take 1 tablet by mouth Daily., Disp: , Rfl:     diclofenac-miSOPROStol (ARTHROTEC 75) 75-0.2 MG EC tablet, Take 1 tablet by mouth 2 (Two) Times a Day., Disp: , Rfl:     empagliflozin (JARDIANCE) 25 MG tablet tablet, Jardiance 25 mg tablet, Disp: , Rfl:     glucosamine-chondroitin 500-400 MG capsule capsule, Take  by mouth 3 (Three) Times a Day With Meals., Disp: , Rfl:     JANUVIA 100 MG tablet, TAKE 1 TABLET BY MOUTH DAILY FOR DIABETES, Disp: , Rfl: 5    levothyroxine (SYNTHROID, LEVOTHROID) 125 MCG tablet, Take 75 mcg by mouth Daily., Disp: , Rfl:     metFORMIN XR (GLUCOPHAGE-XR) 500 MG 24 hr tablet, , Disp: , Rfl:     lisinopril (PRINIVIL,ZESTRIL) 10 MG tablet, TAKE 1 (ONE) TABLET BY MOUTH DAILY FOR BLOOD PRESSURE, Disp: , Rfl:     rosuvastatin (CRESTOR) 40 MG tablet, TAKE 1 (ONE) TABLET BY MOUTH AT BEDTIME FOR CHOLESTEROL., Disp: , Rfl:     tamsulosin (FLOMAX) 0.4 MG capsule 24 hr capsule, TAKE 1 CAPSULE BY MOUTH TWICE PER DAY, Disp: , Rfl: .      The patient's relevant past medical, surgical, family and social history were reviewed and updated in Epic as appropriate.       Review of Systems   Eyes:  Positive for visual disturbance.   Respiratory:  Positive for apnea.    Endocrine: Positive for cold intolerance.   Genitourinary:  Positive for frequency.  "  Musculoskeletal:  Positive for arthralgias and joint swelling.   Neurological:  Positive for numbness. Negative for light-headedness.   Psychiatric/Behavioral:  Positive for sleep disturbance.    All other systems reviewed and are negative.        Objective:    Physical Exam  Constitutional:       Appearance: Normal appearance.   HENT:      Head: Normocephalic and atraumatic.      Mouth/Throat:      Comments: Class 2 airway  Cardiovascular:      Rate and Rhythm: Normal rate and regular rhythm.   Pulmonary:      Effort: Pulmonary effort is normal.      Breath sounds: Normal breath sounds.   Skin:     General: Skin is warm and dry.   Neurological:      Mental Status: He is alert and oriented to person, place, and time.   Psychiatric:         Mood and Affect: Mood normal.         Behavior: Behavior normal.         Thought Content: Thought content normal.         Judgment: Judgment normal.     /60   Pulse 78   Temp 98 °F (36.7 °C)   Ht 180.3 cm (70.98\")   Wt 98 kg (216 lb)   SpO2 95%   BMI 30.14 kg/m²     CPAP Report  82/90 days of use  Greater than 4-hour use 63%  Setting 12 cm H2O  AHI of 0.5    The patient continues to use and benefit from CPAP therapy.    ASSESSMENT/PLAN    Diagnoses and all orders for this visit:    1. KARI (obstructive sleep apnea) (Primary)  -     PAP Therapy      Refill supplies x 1 year.  Return to clinic 1 year sooner symptoms warrant.        Signed by  Chetna Sims, JULISSA    November 4, 2024      CC: Michael Stephens MD         No ref. provider found      "